# Patient Record
Sex: MALE | Race: WHITE | NOT HISPANIC OR LATINO | Employment: OTHER | URBAN - METROPOLITAN AREA
[De-identification: names, ages, dates, MRNs, and addresses within clinical notes are randomized per-mention and may not be internally consistent; named-entity substitution may affect disease eponyms.]

---

## 2022-02-09 DIAGNOSIS — Z80.0 FAMILY HISTORY OF COLON CANCER: Primary | ICD-10-CM

## 2022-05-12 ENCOUNTER — DOCUMENTATION (OUTPATIENT)
Dept: GENETICS | Facility: HOSPITAL | Age: 67
End: 2022-05-12

## 2022-05-12 ENCOUNTER — APPOINTMENT (OUTPATIENT)
Dept: ONCOLOGY | Facility: HOSPITAL | Age: 67
End: 2022-05-12

## 2022-05-12 DIAGNOSIS — Z80.3 FAMILY HISTORY OF MALIGNANT NEOPLASM OF BREAST: ICD-10-CM

## 2022-05-12 DIAGNOSIS — Z13.79 GENETIC TESTING: Primary | ICD-10-CM

## 2022-05-12 DIAGNOSIS — Z80.0 FAMILY HISTORY OF MALIGNANT NEOPLASM OF GASTROINTESTINAL TRACT: ICD-10-CM

## 2022-05-12 NOTE — PROGRESS NOTES
Alvaro Roth, a 67-year-old male, was seen for genetic counseling via telemedicine due to a family history of cancer. He reports having a few colon polyps removed. He does not have a personal cancer history.  Mr. Roth was interested in discussing his risk for a hereditary cancer syndrome and genetic testing options. Mr. Roth opted to pursue testing via the CancerNext panel through [employer] that evaluates 36 genes associated with increased cancer risk.     FAMILY HISTORY (see attached pedigree):    Mother: Breast cancer, 45; Lung cancer, 80  Father:  Colon cancer, 50s  Sister:  Colon cancer, 62  Pat. Uncle: Colon cancer, 70    Medical records regarding his family history were not available for review.     RISK ASSESSMENT: Mr. Roth’s family history led to concern regarding a hereditary cancer syndrome. He clearly meets criteria for Villalobos syndrome testing given his family history of colon cancer in three close relatives. He also meets BRCA1/2 testing criteria given his family history of breast cancer diagnosed at age 45 in his mother.  We discussed multigene panel testing and Mr. Roth was interested in pursuing testing via the CancerNext panel.     GENETIC COUNSELING (45 minutes):  We reviewed the family history information in detail. Cases of cancer follow three general patterns: sporadic, familial, and hereditary.  While most cancer is sporadic, some cases appear to occur in family clusters.  These cases are said to be familial and account for 10-20% of cancer cases.  Familial cases may be due to a combination of shared genes and environmental factors among family members.  In even fewer families, the cancer is said to be inherited, and the genes responsible for the cancer are known.  Family histories typical of hereditary cancer syndromes usually include multiple first- and second-degree relatives diagnosed with cancer types that define a syndrome. These cases tend to be diagnosed at  younger-than-expected ages and can be bilateral or multifocal. The cancer in these families follows an autosomal dominant inheritance pattern, which indicates the likely presence of a mutation in a cancer susceptibility gene. Children and siblings of an individual believed to carry this mutation have a 50% chance of inheriting that mutation, thereby inheriting the increased risk to develop cancer. These mutations can be passed down from the maternal or the paternal lineage.    We discussed Villalobos syndrome given his family history of colon cancer. We discussed that the most common form of hereditary colon cancer is Villalobos syndrome.  It is caused by mutations in the mismatch repair genes. The lifetime risk for colon cancer for individuals with Villalobos syndrome is as high as 80% if there is no intervention (i.e. removal of polyps detected on colonoscopy).  Other risks include endometrial cancer (up to 60%), as well as other cancers (ovarian, upper GI, brain, stomach, pancreatic). We also discussed that a significant proportion of hereditary breast and ovarian cancer can be attributed to mutations in the BRCA1 and BRCA2 genes.  Mutations in these genes confer an increased risk for breast cancer, ovarian cancer, male breast cancer, prostate cancer, and pancreatic cancer. Women with a BRCA1 or BRCA2 mutation have up to an 87% lifetime risk of breast cancer and up to a 44% risk of ovarian cancer.     These genes are not responsible for every case of hereditary breast cancer, and we discussed multigene panels that can evaluate a number of additional cancer related genes simultaneously. The standard approach to genetic testing is via a multigene panel.  Genes included on these panels have varying degrees of risk associated, and management and screening guidelines vary based on the specific gene.  Hereditary cancer syndromes can demonstrate incomplete penetrance and variable expression within families. There are genes that are  evaluated that have been more recently described, and there may be less data regarding the risks and therefore may not have established management guidelines at this time. Based on Mr. Roth’s family history and his desire to get more information regarding his personal risks and risks for his family, he opted to pursue testing through a panel evaluating several other genes known to increase the risk for cancer.    GENETIC TESTING:  The risks, benefits and limitations of genetic testing and implications for clinical management following testing were reviewed. DNA test results can influence decisions regarding screening and prevention.  Genetic testing can have significant psychological implications for both individuals and families. Also discussed was the possibility of employment and insurance discrimination based on genetic test results and the federal and states laws that are in place to prevent this, as well as the limitations of these laws (SAROJ).         We discussed panel testing, which would involve testing multiple genes associated with increased cancer risk. The benefits and limitations of genetic testing were discussed. The implications of a positive or negative test result were discussed. We also discussed the importance of testing on an affected relative. We discussed the possibility that, in some cases, genetic test results may be ambiguous due to the identification of a genetic variant. These variants may or may not be associated with an increased cancer risk. With multigene panel testing, it is not uncommon for a variant of uncertain significance (VUS) to be identified.  If a VUS is identified, testing family members is not recommended and screening recommendations are made based on the family history. The laboratories that perform genetic testing work to reclassify the VUS and send out an amended report if and when a VUS is reclassified.  The majority of variant findings are ultimately reclassified  to a negative result. Given his family history, a negative test result does not eliminate all cancer risk, although the risk would not be as high as it would with positive genetic testing.     PLAN:  Genetic testing via the CancerNext panel through [employer] was ordered. Results are expected in 2-3 weeks. He is welcome to call our office with any questions at [phone].      Darcy Antonio MS, [facility] [place], [facility]  Licensed Certified Genetic Counselor

## 2022-06-01 ENCOUNTER — DOCUMENTATION (OUTPATIENT)
Dept: ONCOLOGY | Facility: HOSPITAL | Age: 67
End: 2022-06-01

## 2022-06-01 ENCOUNTER — DOCUMENTATION (OUTPATIENT)
Dept: GENETICS | Facility: HOSPITAL | Age: 67
End: 2022-06-01

## 2022-06-01 NOTE — PROGRESS NOTES
Alvaro Roth, a 67-year-old male, was seen for genetic counseling via telemedicine due to a family history of cancer. He reports having a few colon polyps removed. He does not have a personal cancer history.  Mr. Roth was interested in discussing his risk for a hereditary cancer syndrome and genetic testing options. Mr. Roth opted to pursue testing via the CancerNext panel through [employer] that evaluates 36 genes associated with increased cancer risk. Genetic testing was negative for pathogenic mutations in the 36 genes included on the CancerNext panel. These normal results were discussed with Mr. Roth by telephone on 6/1/22.    FAMILY HISTORY (see attached pedigree):    Mother: Breast cancer, 45; Lung cancer, 80  Father:  Colon cancer, 50s  Sister:  Colon cancer, 62  Pat. Uncle: Colon cancer, 70    Medical records regarding his family history were not available for review.     RISK ASSESSMENT: Mr. Roth’s family history led to concern regarding a hereditary cancer syndrome. He clearly meets criteria for Villalobos syndrome testing given his family history of colon cancer in three close relatives. He also meets BRCA1/2 testing criteria given his family history of breast cancer diagnosed at age 45 in his mother.  We discussed multigene panel testing and Mr. Roth was interested in pursuing testing via the CancerNext panel.     GENETIC COUNSELING:  We reviewed the family history information in detail. Cases of cancer follow three general patterns: sporadic, familial, and hereditary.  While most cancer is sporadic, some cases appear to occur in family clusters.  These cases are said to be familial and account for 10-20% of cancer cases.  Familial cases may be due to a combination of shared genes and environmental factors among family members.  In even fewer families, the cancer is said to be inherited, and the genes responsible for the cancer are known.  Family histories typical of hereditary cancer  syndromes usually include multiple first- and second-degree relatives diagnosed with cancer types that define a syndrome. These cases tend to be diagnosed at younger-than-expected ages and can be bilateral or multifocal. The cancer in these families follows an autosomal dominant inheritance pattern, which indicates the likely presence of a mutation in a cancer susceptibility gene. Children and siblings of an individual believed to carry this mutation have a 50% chance of inheriting that mutation, thereby inheriting the increased risk to develop cancer. These mutations can be passed down from the maternal or the paternal lineage.    We discussed Villalobos syndrome given his family history of colon cancer. We discussed that the most common form of hereditary colon cancer is Villalobos syndrome.  It is caused by mutations in the mismatch repair genes. The lifetime risk for colon cancer for individuals with Villalobos syndrome is as high as 80% if there is no intervention (i.e. removal of polyps detected on colonoscopy).  Other risks include endometrial cancer (up to 60%), as well as other cancers (ovarian, upper GI, brain, stomach, pancreatic). We also discussed that a significant proportion of hereditary breast and ovarian cancer can be attributed to mutations in the BRCA1 and BRCA2 genes.  Mutations in these genes confer an increased risk for breast cancer, ovarian cancer, male breast cancer, prostate cancer, and pancreatic cancer. Women with a BRCA1 or BRCA2 mutation have up to an 87% lifetime risk of breast cancer and up to a 44% risk of ovarian cancer.     These genes are not responsible for every case of hereditary breast cancer, and we discussed multigene panels that can evaluate a number of additional cancer related genes simultaneously. The standard approach to genetic testing is via a multigene panel.  Genes included on these panels have varying degrees of risk associated, and management and screening guidelines vary based  on the specific gene.  Hereditary cancer syndromes can demonstrate incomplete penetrance and variable expression within families. There are genes that are evaluated that have been more recently described, and there may be less data regarding the risks and therefore may not have established management guidelines at this time. Based on Mr. Roth’s family history and his desire to get more information regarding his personal risks and risks for his family, he opted to pursue testing through a panel evaluating several other genes known to increase the risk for cancer.      GENETIC TESTING:  The risks, benefits and limitations of genetic testing and implications for clinical management following testing were reviewed. DNA test results can influence decisions regarding screening and prevention.  Genetic testing can have significant psychological implications for both individuals and families. Also discussed was the possibility of employment and insurance discrimination based on genetic test results and the federal and states laws that are in place to prevent this, as well as the limitations of these laws (SAROJ).         We discussed panel testing, which would involve testing multiple genes associated with increased cancer risk. The benefits and limitations of genetic testing were discussed. The implications of a positive or negative test result were discussed. We also discussed the importance of testing on an affected relative. We discussed the possibility that, in some cases, genetic test results may be ambiguous due to the identification of a genetic variant. These variants may or may not be associated with an increased cancer risk. With multigene panel testing, it is not uncommon for a variant of uncertain significance (VUS) to be identified.  If a VUS is identified, testing family members is not recommended and screening recommendations are made based on the family history. The laboratories that perform genetic  testing work to reclassify the VUS and send out an amended report if and when a VUS is reclassified.  The majority of variant findings are ultimately reclassified to a negative result. Given his family history, a negative test result does not eliminate all cancer risk, although the risk would not be as high as it would with positive genetic testing.     TEST RESULTS: Genetic testing was negative for known pathogenic mutations by sequencing, rearrangement testing, and RNA analysis for the 36 genes on the CancerNext panel.  This negative result greatly lowers, but does not eliminate, the risk of a hereditary cancer syndrome for Mr. Roth. It is possible that the family history is due to a hereditary cancer syndrome that Mr. Roth did not happen to inherit. This assessment is based on the information provided at the time of the consultation.    CANCER PREVENTION: Per current NCCN guidelines, individuals who have a first-degree relative with a history of colon cancer should begin colonoscopy screening at age 40 or ten years prior to the youngest diagnosis in the family, whichever is earliest, and repeat every five years or more frequently based on personal clinical findings. These assessments are based on the information provided at the time of consultation.     PLAN:  Genetic counseling remains available to Mr. Roth. He is encouraged to call our office if he has any questions, concerns, or updates to the family history at [phone].        Darcy Antonio MS, [facility] [place], [place], [facility]  Licensed Certified Genetic Counselor       Cc: Alvaro Lopez MD